# Patient Record
Sex: MALE | Race: WHITE | NOT HISPANIC OR LATINO | Employment: FULL TIME | URBAN - METROPOLITAN AREA
[De-identification: names, ages, dates, MRNs, and addresses within clinical notes are randomized per-mention and may not be internally consistent; named-entity substitution may affect disease eponyms.]

---

## 2022-02-10 ENCOUNTER — APPOINTMENT (OUTPATIENT)
Dept: ULTRASOUND IMAGING | Facility: CLINIC | Age: 32
End: 2022-02-10
Attending: EMERGENCY MEDICINE
Payer: COMMERCIAL

## 2022-02-10 ENCOUNTER — HOSPITAL ENCOUNTER (EMERGENCY)
Facility: CLINIC | Age: 32
Discharge: HOME | End: 2022-02-10
Attending: EMERGENCY MEDICINE | Admitting: EMERGENCY MEDICINE
Payer: COMMERCIAL

## 2022-02-10 VITALS
HEART RATE: 92 BPM | RESPIRATION RATE: 16 BRPM | WEIGHT: 177 LBS | SYSTOLIC BLOOD PRESSURE: 127 MMHG | OXYGEN SATURATION: 99 % | DIASTOLIC BLOOD PRESSURE: 72 MMHG | TEMPERATURE: 99.5 F

## 2022-02-10 DIAGNOSIS — K29.70 GASTRITIS WITHOUT BLEEDING, UNSPECIFIED CHRONICITY, UNSPECIFIED GASTRITIS TYPE: ICD-10-CM

## 2022-02-10 LAB
ALBUMIN SERPL-MCNC: 4 G/DL (ref 3.5–5)
ALP SERPL-CCNC: 66 U/L (ref 45–120)
ALT SERPL W P-5'-P-CCNC: 61 U/L (ref 0–45)
ANION GAP SERPL CALCULATED.3IONS-SCNC: 10 MMOL/L (ref 5–18)
AST SERPL W P-5'-P-CCNC: 24 U/L (ref 0–40)
BASOPHILS # BLD AUTO: 0.1 10E3/UL (ref 0–0.2)
BASOPHILS NFR BLD AUTO: 1 %
BILIRUB DIRECT SERPL-MCNC: 0.3 MG/DL
BILIRUB SERPL-MCNC: 0.8 MG/DL (ref 0–1)
BUN SERPL-MCNC: 10 MG/DL (ref 8–22)
CALCIUM SERPL-MCNC: 9.1 MG/DL (ref 8.5–10.5)
CHLORIDE BLD-SCNC: 101 MMOL/L (ref 98–107)
CO2 SERPL-SCNC: 26 MMOL/L (ref 22–31)
CREAT SERPL-MCNC: 0.85 MG/DL (ref 0.7–1.3)
EOSINOPHIL # BLD AUTO: 0.2 10E3/UL (ref 0–0.7)
EOSINOPHIL NFR BLD AUTO: 2 %
ERYTHROCYTE [DISTWIDTH] IN BLOOD BY AUTOMATED COUNT: 11.8 % (ref 10–15)
GFR SERPL CREATININE-BSD FRML MDRD: >90 ML/MIN/1.73M2
GLUCOSE BLD-MCNC: 92 MG/DL (ref 70–125)
HCT VFR BLD AUTO: 48.7 % (ref 40–53)
HGB BLD-MCNC: 16.5 G/DL (ref 13.3–17.7)
IMM GRANULOCYTES # BLD: 0 10E3/UL
IMM GRANULOCYTES NFR BLD: 0 %
LIPASE SERPL-CCNC: 17 U/L (ref 0–52)
LYMPHOCYTES # BLD AUTO: 1.1 10E3/UL (ref 0.8–5.3)
LYMPHOCYTES NFR BLD AUTO: 13 %
MCH RBC QN AUTO: 29.7 PG (ref 26.5–33)
MCHC RBC AUTO-ENTMCNC: 33.9 G/DL (ref 31.5–36.5)
MCV RBC AUTO: 88 FL (ref 78–100)
MONOCYTES # BLD AUTO: 0.9 10E3/UL (ref 0–1.3)
MONOCYTES NFR BLD AUTO: 10 %
NEUTROPHILS # BLD AUTO: 6.3 10E3/UL (ref 1.6–8.3)
NEUTROPHILS NFR BLD AUTO: 74 %
NRBC # BLD AUTO: 0 10E3/UL
NRBC BLD AUTO-RTO: 0 /100
PLATELET # BLD AUTO: 255 10E3/UL (ref 150–450)
POTASSIUM BLD-SCNC: 4.1 MMOL/L (ref 3.5–5)
PROT SERPL-MCNC: 7.5 G/DL (ref 6–8)
RBC # BLD AUTO: 5.56 10E6/UL (ref 4.4–5.9)
SODIUM SERPL-SCNC: 137 MMOL/L (ref 136–145)
WBC # BLD AUTO: 8.5 10E3/UL (ref 4–11)

## 2022-02-10 PROCEDURE — 80053 COMPREHEN METABOLIC PANEL: CPT | Performed by: EMERGENCY MEDICINE

## 2022-02-10 PROCEDURE — 250N000011 HC RX IP 250 OP 636: Performed by: EMERGENCY MEDICINE

## 2022-02-10 PROCEDURE — 96374 THER/PROPH/DIAG INJ IV PUSH: CPT

## 2022-02-10 PROCEDURE — 82248 BILIRUBIN DIRECT: CPT | Performed by: EMERGENCY MEDICINE

## 2022-02-10 PROCEDURE — 85025 COMPLETE CBC W/AUTO DIFF WBC: CPT | Performed by: EMERGENCY MEDICINE

## 2022-02-10 PROCEDURE — 99285 EMERGENCY DEPT VISIT HI MDM: CPT | Mod: 25

## 2022-02-10 PROCEDURE — 36415 COLL VENOUS BLD VENIPUNCTURE: CPT | Performed by: EMERGENCY MEDICINE

## 2022-02-10 PROCEDURE — 83690 ASSAY OF LIPASE: CPT | Performed by: EMERGENCY MEDICINE

## 2022-02-10 PROCEDURE — 76705 ECHO EXAM OF ABDOMEN: CPT

## 2022-02-10 RX ORDER — FAMOTIDINE 20 MG/1
20 TABLET, FILM COATED ORAL 2 TIMES DAILY
Qty: 40 TABLET | Refills: 0 | Status: SHIPPED | OUTPATIENT
Start: 2022-02-10

## 2022-02-10 RX ORDER — MORPHINE SULFATE 4 MG/ML
4 INJECTION, SOLUTION INTRAMUSCULAR; INTRAVENOUS ONCE
Status: COMPLETED | OUTPATIENT
Start: 2022-02-10 | End: 2022-02-10

## 2022-02-10 RX ORDER — SUCRALFATE 1 G/1
1 TABLET ORAL 4 TIMES DAILY PRN
Qty: 40 TABLET | Refills: 0 | Status: SHIPPED | OUTPATIENT
Start: 2022-02-10

## 2022-02-10 RX ADMIN — MORPHINE SULFATE 4 MG: 4 INJECTION, SOLUTION INTRAMUSCULAR; INTRAVENOUS at 13:31

## 2022-02-10 NOTE — ED TRIAGE NOTES
Patient is here with mid to upper abdomen pain since yesterday. He did go to the clinic and had blood work done and was told to come here if it worsened. Reports normal bowel and bladder.

## 2022-02-10 NOTE — ED PROVIDER NOTES
EMERGENCY DEPARTMENT ENCOUNTER      NAME: Tirso Rhodes  AGE: 31 year old male  YOB: 1990  MRN: [id number]  EVALUATION DATE & TIME: 2/10/2022 12:58 PM    PCP: No primary care provider on file.    ED PROVIDER: Howie Quintana D.O.      Chief Complaint   Patient presents with     Abdominal Pain       FINAL IMPRESSION:  1. Upper abdominal pain        ED COURSE & MEDICAL DECISION MAKIN:03 PM I met with the patient to gather history and to perform my initial exam. I discussed the plan for care while in the Emergency Department.  1:07 PM Patient requests for pain medication.   2:00 PM Signed patient out to Dr. Lawrence pending final disposition.          Pertinent Labs & Imaging studies reviewed. (See chart for details)  31 year old male presents to the Emergency Department for evaluation of upper abdominal pain.  Patient had labs performed yesterday which did show mild elevation LFTs otherwise was unremarkable.  No imaging was done.  Patient has been trying GI cocktails at home without success.  Exam was primarily upper abdominal.  Labs today are still pending.  Ultrasound has been ordered and is still pending at time of turnover.  If ultrasound is negative will likely require a CT scan of the abdomen.  Will turn over to Dr. Lawrence for final disposition.    At the conclusion of the encounter I discussed the results of all of the tests and the disposition. The questions were answered. The patient or family acknowledged understanding and was agreeable with the care plan.        HPI    Patient information was obtained from: Patient     Use of : N/A     Tirso Rhodes is a 31 year old male who presents with upper abdominal pain since yesterday morning. He endorses associated mild nausea and 1 episode of vomiting. Abdominal pain waxes and wanes in intensity and constant. Pain is described to be as pressured. Patient was seen yesterday for his symptoms. Blood labs were drawn. Denies  imaging. He was given a GI cocktail without relief for pain. Denies medical problems. Notes history of knee surgery. Patient denies diarrhea, cough, congestion, sore throat, fever, chills, or any additional complaints at this time.     Patient denies smoking. Patient occasionally drinks alcohol.       REVIEW OF SYSTEMS  Constitutional:  Denies fever, chills, weight loss or weakness  Eyes:  No pain, discharge, redness  HENT:  Denies sore throat, ear pain, congestion  Respiratory: No SOB, wheeze or cough  Cardiovascular:  No CP, palpitations  GI:  Denies diarrhea. Endorses abdominal pain, nausea, vomiting   : Denies dysuria, hematuria  Musculoskeletal:  Denies any new muscle/joint pain, swelling or loss of function.  Skin:  Denies rash, pallor  Neurologic:  Denies headache, focal weakness or sensory changes  Lymph: Denies swollen nodes    All other systems negative unless noted in HPI.    PAST MEDICAL HISTORY:  History reviewed. No pertinent past medical history.    PAST SURGICAL HISTORY:  History reviewed. No pertinent surgical history.      CURRENT MEDICATIONS:    No current facility-administered medications for this encounter.     No current outpatient medications on file.         ALLERGIES:  No Known Allergies    FAMILY HISTORY:  History reviewed. No pertinent family history.    SOCIAL HISTORY:  Social History     Socioeconomic History     Marital status: None     Spouse name: None     Number of children: None     Years of education: None     Highest education level: None   Occupational History     None   Tobacco Use     Smoking status: None     Smokeless tobacco: None   Substance and Sexual Activity     Alcohol use: None     Drug use: None     Sexual activity: None   Other Topics Concern     None   Social History Narrative     None     Social Determinants of Health     Financial Resource Strain: Not on file   Food Insecurity: Not on file   Transportation Needs: Not on file   Physical Activity: Not on file    Stress: Not on file   Social Connections: Not on file   Intimate Partner Violence: Not on file   Housing Stability: Not on file       VITALS:  Patient Vitals for the past 24 hrs:   BP Temp Temp src Pulse Resp SpO2 Weight   02/10/22 1218 (!) 124/96 99.5  F (37.5  C) Temporal 110 16 99 % 80.3 kg (177 lb)       PHYSICAL EXAM    VITAL SIGNS: BP (!) 124/96   Pulse 110   Temp 99.5  F (37.5  C) (Temporal)   Resp 16   Wt 80.3 kg (177 lb)   SpO2 99%     General Appearance: Well-appearing, well-nourished, no acute distress   Head:  Normocephalic, without obvious abnormality, atraumatic  Eyes:  PERRL, conjunctiva/corneas clear, EOM's intact,  ENT:  Lips, mucosa, and tongue normal, membranes are moist without pallor  Neck:  Normal ROM, symmetrical, trachea midline    Cardio:  Regular rate and rhythm, no murmur, rub or gallop, 2+ pulses symmetric in all extremities  Pulm:  Clear to auscultation bilaterally, respirations unlabored,  Abdomen:  Soft, bilateral upper quadrant tenerness, no rebound or guarding.  Musculoskeletal: Full ROM, no edema, no cyanosis, good ROM of major joints  Integument:  Warm, Dry, No erythema, No rash.    Neurologic:  Alert & oriented.  No focal deficits appreciated.  Ambulatory.  Psychiatric:  Affect normal, Judgment normal, Mood normal.      LABS  Results for orders placed or performed during the hospital encounter of 02/10/22 (from the past 24 hour(s))   CBC with platelets differential    Narrative    The following orders were created for panel order CBC with platelets differential.  Procedure                               Abnormality         Status                     ---------                               -----------         ------                     CBC with platelets and d...[[id number]]                      Final result                 Please view results for these tests on the individual orders.   Basic metabolic panel   Result Value Ref Range    Sodium 137 136 - 145 mmol/L    Potassium  4.1 3.5 - 5.0 mmol/L    Chloride 101 98 - 107 mmol/L    Carbon Dioxide (CO2) 26 22 - 31 mmol/L    Anion Gap 10 5 - 18 mmol/L    Urea Nitrogen 10 8 - 22 mg/dL    Creatinine 0.85 0.70 - 1.30 mg/dL    Calcium 9.1 8.5 - 10.5 mg/dL    Glucose 92 70 - 125 mg/dL    GFR Estimate >90 >60 mL/min/1.73m2   Lipase   Result Value Ref Range    Lipase 17 0 - 52 U/L   Hepatic function panel   Result Value Ref Range    Bilirubin Total 0.8 0.0 - 1.0 mg/dL    Bilirubin Direct 0.3 <=0.5 mg/dL    Protein Total 7.5 6.0 - 8.0 g/dL    Albumin 4.0 3.5 - 5.0 g/dL    Alkaline Phosphatase 66 45 - 120 U/L    AST 24 0 - 40 U/L    ALT 61 (H) 0 - 45 U/L   CBC with platelets and differential   Result Value Ref Range    WBC Count 8.5 4.0 - 11.0 10e3/uL    RBC Count 5.56 4.40 - 5.90 10e6/uL    Hemoglobin 16.5 13.3 - 17.7 g/dL    Hematocrit 48.7 40.0 - 53.0 %    MCV 88 78 - 100 fL    MCH 29.7 26.5 - 33.0 pg    MCHC 33.9 31.5 - 36.5 g/dL    RDW 11.8 10.0 - 15.0 %    Platelet Count 255 150 - 450 10e3/uL    % Neutrophils 74 %    % Lymphocytes 13 %    % Monocytes 10 %    % Eosinophils 2 %    % Basophils 1 %    % Immature Granulocytes 0 %    NRBCs per 100 WBC 0 <1 /100    Absolute Neutrophils 6.3 1.6 - 8.3 10e3/uL    Absolute Lymphocytes 1.1 0.8 - 5.3 10e3/uL    Absolute Monocytes 0.9 0.0 - 1.3 10e3/uL    Absolute Eosinophils 0.2 0.0 - 0.7 10e3/uL    Absolute Basophils 0.1 0.0 - 0.2 10e3/uL    Absolute Immature Granulocytes 0.0 <=0.4 10e3/uL    Absolute NRBCs 0.0 10e3/uL         RADIOLOGY  Abdomen US, limited (RUQ only)    (Results Pending)            MEDICATIONS GIVEN IN THE EMERGENCY:  Medications   morphine (PF) injection 4 mg (4 mg Intravenous Given 2/10/22 1331)       NEW PRESCRIPTIONS STARTED AT TODAY'S ER VISIT  New Prescriptions    No medications on file        Howie Quintana D.O.  Emergency Medicine  [facility] EMERGENCY ROOM  [address] [address] [phone]  Dept: [phone]     Howie Quintana  DO [place]  02/10/22 1417

## 2022-02-10 NOTE — DISCHARGE INSTRUCTIONS
Our primary care medical scheduling team will reach out to you this week to help you to to set up a primary care medical appointment.

## 2022-02-10 NOTE — ED PROVIDER NOTES
ED SIGNOUT  Date/Time:2/10/2022 2:07 PM    Patient presents with epigastric abdominal pain. Received GI cocktail prior to sign out.    Patient signed out to me by my colleague, Dr. Quintana.  Please see their note for complete history and physical. Plan to follow up on right upper quadrant ultrasound and reassessment     The creation of this record is based on the scribe s observations of the work being performed by Alison Alston and the provider s statements to them. It was created on their behalf by Alison Alston a trained medical scribe. This document has been checked and approved by the attending provider.          Vitals:  /76   Pulse 88   Temp 99.5  F (37.5  C) (Temporal)   Resp 16   Wt 80.3 kg (177 lb)   SpO2 98%       Pertinent labs results reviewed   Results for orders placed or performed during the hospital encounter of 02/10/22   Abdomen US, limited (RUQ only)    Impression    IMPRESSION:  1.  No gallstones or biliary dilatation.  2.  Otherwise, unremarkable ultrasound of right upper quadrant.     Basic metabolic panel   Result Value Ref Range    Sodium 137 136 - 145 mmol/L    Potassium 4.1 3.5 - 5.0 mmol/L    Chloride 101 98 - 107 mmol/L    Carbon Dioxide (CO2) 26 22 - 31 mmol/L    Anion Gap 10 5 - 18 mmol/L    Urea Nitrogen 10 8 - 22 mg/dL    Creatinine 0.85 0.70 - 1.30 mg/dL    Calcium 9.1 8.5 - 10.5 mg/dL    Glucose 92 70 - 125 mg/dL    GFR Estimate >90 >60 mL/min/1.73m2   Result Value Ref Range    Lipase 17 0 - 52 U/L   Hepatic function panel   Result Value Ref Range    Bilirubin Total 0.8 0.0 - 1.0 mg/dL    Bilirubin Direct 0.3 <=0.5 mg/dL    Protein Total 7.5 6.0 - 8.0 g/dL    Albumin 4.0 3.5 - 5.0 g/dL    Alkaline Phosphatase 66 45 - 120 U/L    AST 24 0 - 40 U/L    ALT 61 (H) 0 - 45 U/L   CBC with platelets and differential   Result Value Ref Range    WBC Count 8.5 4.0 - 11.0 10e3/uL    RBC Count 5.56 4.40 - 5.90 10e6/uL    Hemoglobin 16.5 13.3 - 17.7 g/dL    Hematocrit 48.7 40.0 - 53.0  %    MCV 88 78 - 100 fL    MCH 29.7 26.5 - 33.0 pg    MCHC 33.9 31.5 - 36.5 g/dL    RDW 11.8 10.0 - 15.0 %    Platelet Count 255 150 - 450 10e3/uL    % Neutrophils 74 %    % Lymphocytes 13 %    % Monocytes 10 %    % Eosinophils 2 %    % Basophils 1 %    % Immature Granulocytes 0 %    NRBCs per 100 WBC 0 <1 /100    Absolute Neutrophils 6.3 1.6 - 8.3 10e3/uL    Absolute Lymphocytes 1.1 0.8 - 5.3 10e3/uL    Absolute Monocytes 0.9 0.0 - 1.3 10e3/uL    Absolute Eosinophils 0.2 0.0 - 0.7 10e3/uL    Absolute Basophils 0.1 0.0 - 0.2 10e3/uL    Absolute Immature Granulocytes 0.0 <=0.4 10e3/uL    Absolute NRBCs 0.0 10e3/uL       Pertinent imaging reviewed   Please see official radiology report.  Abdomen US, limited (RUQ only)   Final Result   IMPRESSION:   1.  No gallstones or biliary dilatation.   2.  Otherwise, unremarkable ultrasound of right upper quadrant.              Interventions  Medications   lidocaine (viscous) (XYLOCAINE) 2 % 15 mL, alum & mag hydroxide-simethicone (MAALOX) 15 mL GI Cocktail (has no administration in time range)   morphine (PF) injection 4 mg (4 mg Intravenous Given 2/10/22 1331)        ED Course/MDM:  2:07 PM Signout accepted from Dr. Quintana.  Prior records were reviewed.  Diagnostics from this visit are reviewed.      ED Course as of 02/10/22 1602   Thu Feb 10, 2022   1414 Pt signed out to me by LEWIS SHUKLA pending RUQ US and reassessment with epigastric discomfort   1601 Patient clinically reassessed and improved overall, with again epigastric isolated discomfort now much improved, amenable to GI f/u, PMD establishmed, begun on H2 blocker with PRN carafate and given anticipatory guidance and return precautions.           1. Gastritis without bleeding, unspecified chronicity, unspecified gastritis type          Funmi Lawrence MD  [facility] Emergency Department      Funmi Lawrence MD  02/10/22 1602

## 2022-04-03 ENCOUNTER — HEALTH MAINTENANCE LETTER (OUTPATIENT)
Age: 32
End: 2022-04-03

## 2022-10-03 ENCOUNTER — HEALTH MAINTENANCE LETTER (OUTPATIENT)
Age: 32
End: 2022-10-03

## 2023-05-21 ENCOUNTER — HEALTH MAINTENANCE LETTER (OUTPATIENT)
Age: 33
End: 2023-05-21

## 2024-07-28 ENCOUNTER — HEALTH MAINTENANCE LETTER (OUTPATIENT)
Age: 34
End: 2024-07-28